# Patient Record
Sex: FEMALE | Race: OTHER
[De-identification: names, ages, dates, MRNs, and addresses within clinical notes are randomized per-mention and may not be internally consistent; named-entity substitution may affect disease eponyms.]

---

## 2018-09-22 ENCOUNTER — HOSPITAL ENCOUNTER (EMERGENCY)
Dept: HOSPITAL 62 - ER | Age: 23
Discharge: HOME | End: 2018-09-22
Payer: OTHER GOVERNMENT

## 2018-09-22 VITALS — DIASTOLIC BLOOD PRESSURE: 99 MMHG | SYSTOLIC BLOOD PRESSURE: 137 MMHG

## 2018-09-22 DIAGNOSIS — S93.402A: Primary | ICD-10-CM
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
## 2018-09-22 PROCEDURE — 99283 EMERGENCY DEPT VISIT LOW MDM: CPT

## 2018-09-22 PROCEDURE — L1902 AFO ANKLE GAUNTLET PRE OTS: HCPCS

## 2018-09-22 PROCEDURE — 73610 X-RAY EXAM OF ANKLE: CPT

## 2018-09-22 NOTE — RADIOLOGY REPORT (SQ)
EXAM DESCRIPTION: 



Left ankle x-rays, three views dated September 22, 2018 at 9:45

PM



CLINICAL HISTORY: 



injury 



COMPARISON: 



None



FINDINGS: 



Three x-ray views of the left ankle were submitted. There is no

acute fracture or dislocation. Bone mineralization is within

normal limits. There is no radiopaque foreign body material.



IMPRESSION: 



No acute fracture or dislocation.

## 2018-09-22 NOTE — ER DOCUMENT REPORT
HPI





- HPI


Pain Level: 4


Notes: 





Patient is a 23-year-old female who presents to emergency department today with 

complaint of left ankle pain.  Patient reports that she rolled her ankle just 

prior to arrival while walking through some grass.  Patient is able to ambulate

, denies any history of trauma to this area.





- CONSTITUTIONAL


Constitutional: DENIES: Fever, Chills





- EENT


EENT: DENIES: Sore Throat, Ear Pain, Eye problems





- NEURO


Neurology: DENIES: Headache, Weakness, Vision blurred, Dizzinesss / Vertigo





- CARDIOVASCULAR


Cardiovascular: DENIES: Chest pain





- RESPIRATORY


Respiratory: DENIES: Trouble Breathing, Coughing





- GASTROINTESTINAL


Gastrointestinal: DENIES: Abdominal Pain, Black / Bloody Stools





- URINARY


Urinary: DENIES: Dysuria, Urgency, Frequency





- REPRODUCTIVE


LMP: 9-01-18


Reproductive: DENIES: Pregnant:, Postmenopausal, Abnormal bleeding / discharge





- MUSCULOSKELETAL


Musculoskeletal: Comment Only: Extremity pain - left ankle pain





Past Medical History





- General


Information source: Patient





- Social History


Smoking Status: Never Smoker


Chew tobacco use (# tins/day): No


Frequency of alcohol use: None


Drug Abuse: None


Family History: Reviewed & Not Pertinent


Patient has suicidal ideation: No


Patient has homicidal ideation: No





- Medical History


Medical History: Negative


Renal/ Medical History: Denies: Hx Peritoneal Dialysis


Surgical Hx: Negative





Vertical Provider Document





- CONSTITUTIONAL


Notes: 





PHYSICAL EXAMINATION:





GENERAL: Well-appearing, well-nourished and in no acute distress.





HEAD: Atraumatic, normocephalic.





EYES: Pupils equal round extraocular movements intact,  conjunctiva are normal.





ENT: Nares patent





NECK: Normal range of motion





LUNGS: No respiratory distress





Musculoskeletal: Normal range of motion, capillary refill less than 3 seconds, 

normal motor and sensation distal to injury.





NEUROLOGICAL:  Normal speech, normal gait. 





PSYCH: Normal mood, normal affect.





SKIN: Warm, Dry, normal turgor, no rashes or lesions noted.





Course





- Re-evaluation


Re-evalutation: 





X-ray is negative for any acute fractures or dislocations.  Patient will be 

placed in ankle stirrup splint for comfort and offered crutches.





- Vital Signs


Vital signs: 


 











Temp Pulse Resp BP Pulse Ox


 


 98.4 F   105 H  20   137/99 H  99 


 


 09/22/18 21:05  09/22/18 21:05  09/22/18 21:05  09/22/18 21:05  09/22/18 21:05














Procedures





- Immobilization


  ** Left ankle


Immobilizer type: Ace wrap, Ankle stirrup, Crutches





Discharge





- Discharge


Clinical Impression: 


Ankle sprain


Qualifiers:


 Encounter type: initial encounter Involved ligament of ankle: unspecified 

ligament Laterality: left Qualified Code(s): S93.402A - Sprain of unspecified 

ligament of left ankle, initial encounter





Condition: Stable


Disposition: HOME, SELF-CARE


Additional Instructions: 


ACE WRAP:


     A compression dressing (ace wrap) has been placed.  This helps hold the 

area still. It limits swelling and internal bleeding.


     The wrap should be comfortably snug -- not tight.  You should feel a sense 

of pressure, but not severe pain under the wrap.  Unless the physician tells 

you otherwise, you can adjust the wrap for comfort.


     If the wrap causes symptoms suggesting it's too tight -- uncomfortable 

pressure, swelling or discoloration beyond the wrap, numbness, or severe pain -

- you must loosen the wrap.  If these symptoms don't resolve promptly, return 

for re-evaluation.








SPRAINED ANKLE:


     Your sprained ankle results from stretching or tearing of the ligaments 

which support the ankle.  This usually results from twisting the foot inward 

and under.  The ligaments will require time and protection in order to heal 

properly.  Many ankle sprains are quite disabling, and should be taken 

seriously.


     The usual treatment for an ankle sprain is cold packs; protection with tape

, splints, or wraps; elevation; and staying off the ankle for at least a day.  

As the ankle improves, you can walk IF it's not painful to bear weight.  Sports 

are best postponed until healing is complete.  More serious sprains usually 

require strengthening exercises after early healing.


     Your physician has assessed the seriousness of the ligament injury to your 

ankle.  However, the treatment may change, depending on how your ankle 

progresses.  If further exams were recommended, it is important that you follow 

through.  Call the doctor if your foot becomes numb, painful, or severely 

swollen.








ANKLE STIRRUP SPLINT:


     You are to use an ankle brace called a stirrup splint.  This type of brace 

allows you to place greater stresses on the ankle without risk of re-injury, 

and is often used for more severe ankle injuries such as avulsion fractures and 

ligament ruptures.


     The splint can be worn over a sock or tape.  For proper support, wear the 

splint with a shoe over it.


     It's important that the splint fit properly.  Adjust the heel tension, if 

needed.  If your splint has air bladders, peel back the bottom of each air 

bladder, then move the Velcro attachment of the heel strap up or down.  Air 

bladder pressure can be adjusted by pulling up the valve at the top, threading 

the air tube down into the main bladder, then blowing air into the bladder or 

squeezing it out.  The two sides of the stirrup can be moved forward or back on 

your ankle by changing the attachment of the main straps.


     If you are unable to use the ankle comfortably in the splint, return for re

-evaluation.








SOFT ANKLE SPLINT:


     You are to wear a cloth ankle splint.  This type of splint uses the 

strength of the fabric to keep the ankle from twisting.


     The splint can be worn over a sock, if it's more comfortable. If the 

splint has an adjustable strap, the strap should come up over the OUTER side of 

the ankle.  This strap should be pulled tight enough so you can't turn your 

ankle in towards you -- it should hold your foot so the sole can't be turned 

towards at the other foot.


     You should start out slow.  Like a new shoe, the splint may take some 

"breaking in."  You will get blisters if you are too active at first.


     No ankle brace provides absolute protection.  You must avoid activities 

which put your ankle at risk.  Work on strengthening your ankle -- strength is 

your best protection against re-injury.


     Call the doctor if you can't do your normal activities in the ankle brace.








USE OF CRUTCHES:


     The doctor has recommended that you not bear weight at this time. You will 

need to use crutches.  Adjust the crutches so the tops come to about two inches 

under the armpit while you are standing upright.  Use your hands -- not your 

armpits -- to support your weight.


     To get into a chair, support yourself with one crutch on the injured side.

  Hold the chair with the other hand, then lower yourself while putting all 

your weight on the good leg.


     Going up stairs is `good leg up, step up, then bring up crutches and bad 

leg.'  Down stairs is `bad leg and crutches down, then bring good leg down.'


     If you develop numbness or swelling in an arm or hand, you are using the 

crutches incorrectly.  Return if you are having any problems with the crutches.








ICE & ELEVATION:


     Apply ice packs frequently against the painful area.  Many different 

schedules are recommended, such as "20 minutes on, 20 minutes off" or "one hour 

ice, two hours rest."  If you need to work, you may need to go longer between 

ice treatments.  You should plan to have the area ice packed AT LEAST one-

fourth of the time.


     The ice should be applied over the wrap, tape, or splint, or over a layer 

of cloth -- not directly against the skin.  Some ice bags have a built-in cloth 

and can be put directly on the skin.


     Your injured part should be elevated as much as possible over the next 48 

hours.  Try to keep the injury above the level of the heart. Avoid use of the 

injured area.  Elevation and rest will decrease the swelling.








USE OF OVER-THE-COUNTER IBUPROFEN:


     Ibuprofen (Advil, Nuprin, Medipren, Motrin IB) is a medication for fever 

and pain control.  In addition, it has anti- inflammatory effects which may be 

beneficial, especially in the treatment of injuries.


     It's best to take ibuprofen with food.  Persons with ulcer disease or 

allergy to aspirin should notify their physician of this before taking 

ibuprofen.


     Ibuprofen can be given every four to six hours, for a total of four doses 

daily.


     Age              Pain or fever dose          Antiinflammatory dose


     


     14-adult            400 mg (2 tab)                600 mg (3 tab)








FOLLOW-UP CARE:


If you have been referred to a physician for follow-up care, call the physician

s office for an appointment as you were instructed or within the next two days.

  If you experience worsening or a significant change in your symptoms, notify 

the physician immediately or return to the Emergency Department at any time for 

re-evaluation.





***Your x-ray today was negative for any acute findings to include fracture or 

dislocation.  Please use the splint for comfort, use the crutches.  Ice and 

elevate as outlined above.  Please take ibuprofen 600 mg every 6 hours this 

will help with pain and inflammation.  Please follow-up with your primary care 

provider in the next 2-3 days, return sooner if worsening.***